# Patient Record
Sex: FEMALE | Race: ASIAN | NOT HISPANIC OR LATINO | URBAN - METROPOLITAN AREA
[De-identification: names, ages, dates, MRNs, and addresses within clinical notes are randomized per-mention and may not be internally consistent; named-entity substitution may affect disease eponyms.]

---

## 2022-01-14 ENCOUNTER — EMERGENCY (EMERGENCY)
Facility: HOSPITAL | Age: 32
LOS: 1 days | Discharge: HOME | End: 2022-01-14
Attending: STUDENT IN AN ORGANIZED HEALTH CARE EDUCATION/TRAINING PROGRAM | Admitting: STUDENT IN AN ORGANIZED HEALTH CARE EDUCATION/TRAINING PROGRAM
Payer: MEDICAID

## 2022-01-14 VITALS
HEART RATE: 87 BPM | OXYGEN SATURATION: 100 % | DIASTOLIC BLOOD PRESSURE: 77 MMHG | RESPIRATION RATE: 16 BRPM | SYSTOLIC BLOOD PRESSURE: 135 MMHG

## 2022-01-14 VITALS
TEMPERATURE: 99 F | SYSTOLIC BLOOD PRESSURE: 125 MMHG | HEART RATE: 96 BPM | OXYGEN SATURATION: 97 % | DIASTOLIC BLOOD PRESSURE: 50 MMHG | RESPIRATION RATE: 18 BRPM

## 2022-01-14 PROCEDURE — 93971 EXTREMITY STUDY: CPT | Mod: 26,LT

## 2022-01-14 PROCEDURE — 99284 EMERGENCY DEPT VISIT MOD MDM: CPT

## 2022-01-14 RX ORDER — IBUPROFEN 200 MG
600 TABLET ORAL ONCE
Refills: 0 | Status: COMPLETED | OUTPATIENT
Start: 2022-01-14 | End: 2022-01-14

## 2022-01-14 RX ADMIN — Medication 600 MILLIGRAM(S): at 13:01

## 2022-01-14 NOTE — ED PROVIDER NOTE - ATTENDING CONTRIBUTION TO CARE
Sreekanth Rodriguez DO:  patient seen and evaluated with the PA.  I was present for key portions of the History & Physical, and I agree with the Impression & Plan. 30 yo f no reported pmh, pw r sided calf pain/lateral aspect. 4 days of sx. recently started new workout regimen. has pain w/ ambulation/stretching. denies trauma, n/v, paresthesias, hx pe/dvt. no meds including ocps. reports drove to [place] 12/25 w/ minimal breaks. arrives hds, well appaering, LE symmetrical, no ttp of calves b/l. dp 2+ b/l. neurovascularly intact. low suspicion however given pts recent travel will check [place].

## 2022-01-14 NOTE — ED PROVIDER NOTE - PATIENT PORTAL LINK FT
You can access the FollowMyHealth Patient Portal offered by [facility] by registering at the following website: [email or web address]. By joining [facility]’s FollowMyHealth portal, you will also be able to view your health information using other applications (apps) compatible with our system.

## 2022-01-14 NOTE — ED PROVIDER NOTE - CLINICAL SUMMARY MEDICAL DECISION MAKING FREE TEXT BOX
32 y/o F presents to the ED w/ R calf pain. Likely strain due to activity at gym. Will advise NSAIDs and rest. Likely d/c home w/ PMD f/u. 32 y/o F presents to the ED w/ R calf pain. Likely strain due to activity at gym. Will advise NSAIDs and rest. Duplex r/o dvt  Likely d/c home w/ PMD f/u.

## 2022-01-14 NOTE — ED PROVIDER NOTE - NSFOLLOWUPINSTRUCTIONS_ED_ALL_ED_FT
Rest, ice, elevate area.      Take Motrin 600mg every 8 hrs with food for pain.     Take Tylenol 650mg 1 tab every 4-6 hours as needed for pain    Follow up with PMD within 48-72 hrs.      Any worsening pain, swelling, weakness, numbness return to ED.

## 2022-01-14 NOTE — ED PROVIDER NOTE - LOWER EXTREMITY EXAM, RIGHT
No pedal edema or calf swelling, non tender to palpation, FROM all joints throughout, pt is ambulatory. NVI.

## 2022-01-14 NOTE — ED ADULT TRIAGE NOTE - CHIEF COMPLAINT QUOTE
pt reports pain r upper thigh  x 2 days denies redness ,swelling to area. denies diff breathing. states changed work out routine pt reports pain r upper thigh  x 2 days denies redness ,swelling to area. denies diff breathing.

## 2022-01-14 NOTE — ED ADULT NURSE NOTE - OBJECTIVE STATEMENT
received pt in intake room 12, 31 yr/o female A+Ox4, ambulatory at baseline. presented to the ED C/O right thigh pain. received pt in intake room 12, 31 yr/o female A+Ox4, ambulatory at baseline. presented to the ED C/O right calf pain for the past few days. Pt states she first noted the pain started around her knee and now is having calf pain w/ walking and standing. pain worsens with exercise. denies chest pain and SOB, RR even and unlabored. denies abdominal pain N/V/D/C. medications given as ordered. will continue to monitor.

## 2022-01-14 NOTE — ED ADULT NURSE NOTE - NSIMPLEMENTINTERV_GEN_ALL_ED
Implemented All Fall Risk Interventions:  [place] to call system. Call bell, personal items and telephone within reach. Instruct patient to call for assistance. Room bathroom lighting operational. Non-slip footwear when patient is off stretcher. Physically safe environment: no spills, clutter or unnecessary equipment. Stretcher in lowest position, wheels locked, appropriate side rails in place. Provide visual cue, wrist band, yellow gown, etc. Monitor gait and stability. Monitor for mental status changes and reorient to person, place, and time. Review medications for side effects contributing to fall risk. Reinforce activity limits and safety measures with patient and family.

## 2022-01-14 NOTE — ED PROVIDER NOTE - OBJECTIVE STATEMENT
32 y/o F, non smoker w/ no significant PMHx presents to the ED c/o R calf pain for the past few days. Pt states she first noted the pain started around her knee and now is having calf pain w/ walking and standing. Pt states the pain started after using a new machine at the gym (stair stepper). Denies numbness, tingling, blunt trauma, or falls, denies fever, chills. swelling, redness, chest pain, or SOB. 30 y/o F, non smoker w/ no significant PMHx presents to the ED c/o R calf pain for the past few days. Pt states she first noted the pain started around her knee and now is having calf pain w/ walking and standing. Pt states the pain started after using a new machine at the gym (stair stepper). Denies numbness, tingling, blunt trauma, or falls, denies fever, chills. swelling, redness, chest pain, or SOB.   Patient reports travel/ driving to [place] for the holidays.